# Patient Record
Sex: FEMALE | Race: WHITE
[De-identification: names, ages, dates, MRNs, and addresses within clinical notes are randomized per-mention and may not be internally consistent; named-entity substitution may affect disease eponyms.]

---

## 2018-04-18 ENCOUNTER — HOSPITAL ENCOUNTER (OUTPATIENT)
Dept: HOSPITAL 89 - MAMO | Age: 63
End: 2018-04-18
Attending: OBSTETRICS & GYNECOLOGY
Payer: COMMERCIAL

## 2018-04-18 DIAGNOSIS — Z12.31: Primary | ICD-10-CM

## 2018-04-18 PROCEDURE — 77063 BREAST TOMOSYNTHESIS BI: CPT

## 2018-04-18 PROCEDURE — 77067 SCR MAMMO BI INCL CAD: CPT

## 2018-04-19 NOTE — RADIOLOGY IMAGING REPORT
FACILITY: [facility] 

 

PATIENT NAME: HEIDI FUENTES

: [id number]

MR: [id number]

V: [id number]

EXAM DATE: 

ORDERING PHYSICIAN: MARY EDDY

TECHNOLOGIST: Sheree Wilkinson

 

PROCEDURE:BILATERAL DIGITAL SCREENING MAMMOGRAM WITH CAD ASSISTED 

INTERPRETATION & 3D TOMOSYNTHESIS 

 

COMPARISON:Prior mammograms 17, 16, 2/16/15, 2/3/14, 

14, 12.

 

INDICATIONS:SCREENING

 

FINDINGS:

Mildly heterogeneous fibroglandular tissue is seen throughout the 

breasts. The parenchymal pattern has remained stable allowing for 

difference in mammographic technique & patient positioning. There is 

no evidence of malignant appearing mass, malignant appearing 

calcifications or other secondary sign of malignancy in either breast.

 

DIAGNOSTIC CATEGORY 1--NEGATIVE.  

 

RECOMMENDATIONS:

ROUTINE MAMMOGRAM AND CLINICAL EVALUATION.   

 

IMPRESSION:

BIRADS 1: Negative 

No significant abnormality is seen.

 

 

 

 

 

 

 

 

 

Dictated by:  Ele Dsouza M.D. on 2018 at 16:17   

Transcribed by: FIX on 2018 at 8:51    

Approved by:  Ele Dsouza M.D. on 2018 at 16:21   

[facility]